# Patient Record
Sex: FEMALE | Race: WHITE | NOT HISPANIC OR LATINO | URBAN - METROPOLITAN AREA
[De-identification: names, ages, dates, MRNs, and addresses within clinical notes are randomized per-mention and may not be internally consistent; named-entity substitution may affect disease eponyms.]

---
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
## 2023-11-21 ENCOUNTER — HOSPITAL ENCOUNTER (EMERGENCY)
Facility: CLINIC | Age: 18
Discharge: HOME | End: 2023-11-21
Admitting: FAMILY MEDICINE
Payer: COMMERCIAL

## 2023-11-21 PROCEDURE — 99281 EMR DPT VST MAYX REQ PHY/QHP: CPT

## 2024-12-12 ENCOUNTER — TRANSFERRED RECORDS (OUTPATIENT)
Dept: HEALTH INFORMATION MANAGEMENT | Facility: CLINIC | Age: 19
End: 2024-12-12
Payer: COMMERCIAL

## 2024-12-16 ENCOUNTER — MEDICAL CORRESPONDENCE (OUTPATIENT)
Dept: HEALTH INFORMATION MANAGEMENT | Facility: CLINIC | Age: 19
End: 2024-12-16
Payer: COMMERCIAL

## 2025-01-08 ENCOUNTER — TELEPHONE (OUTPATIENT)
Dept: OTOLARYNGOLOGY | Facility: CLINIC | Age: 20
End: 2025-01-08
Payer: COMMERCIAL

## 2025-01-08 NOTE — TELEPHONE ENCOUNTER
Lvm to get patient scheduled for urgent visit with [facility] voice for Tomorrow (Wednesday) at 3:30 with Loree Hess   Friday 10/10 at 2 or 3 with Isaias Reeves.   Gave direct call back number.  Piedad Segura on 1/8/2025 at 11:53 AM

## 2025-01-09 ENCOUNTER — PRE VISIT (OUTPATIENT)
Dept: OTOLARYNGOLOGY | Facility: CLINIC | Age: 20
End: 2025-01-09

## 2025-01-09 ENCOUNTER — OFFICE VISIT (OUTPATIENT)
Dept: OTOLARYNGOLOGY | Facility: CLINIC | Age: 20
End: 2025-01-09
Payer: COMMERCIAL

## 2025-01-09 DIAGNOSIS — J38.4 VOCAL FOLD EDEMA: ICD-10-CM

## 2025-01-09 DIAGNOSIS — R49.0 DYSPHONIA: Primary | ICD-10-CM

## 2025-01-09 NOTE — TELEPHONE ENCOUNTER
FUTURE VISIT INFORMATION      FUTURE VISIT INFORMATION:  Date: 1/9/25  Time: 3:30pm  Location: [facility]  REFERRAL INFORMATION:  Reason for visit/diagnosis  vernon    RECORDS REQUESTED FROM:

## 2025-01-09 NOTE — PROGRESS NOTES
[facility]  Clinical Voice and Upper Airway Evaluation Report    Patient's Name: ***  Date of Evaluation: 1/9/2025  Providing SLP: Loree Hess MS CCC-SLP  Seen in Conjunction With: ***, MD - ***, [facility]-SLP  Referring Provider and Facility: ***  Insurance Coverage: *** (Certification Dates: *** - ***)  Chief Complaint: ***  Evaluation Location: [facility]  Others in Attendance for the Evaluation: ***  Time of Evaluation: 3:41 - ***      Patient History: *** is a ***-year-old *** who presents today for evaluation of ***.     Dysphonia:   Onset: ***  Progression: ***  Concerns  Many illnesses this past semester + very stuffy new apartment  Vocal fatigue  Would recover to about 80% between each cold before getting another  Most recent illness was in the early/middle of December and is gradually improving since then  Singing voice changes  Upper register has resonance change  Whistle tones are less present  Patterns:   Has been resting her voice over break per   Denies improvements with Flonase  Does take an antihistamine  Improves with: ***  Worsens with: ***  Previous voice therapy or other interventions: ***  Voice use considerations:   [employer] vocal music education major    Dyspnea:   Onset: ***  Progression: ***  Respiratory conditions: denies  Inhaled treatments: ***  Most recent PFTs: ***  Concerns  More difficulty with ***  Noisy on ***  *** sensation in the ***  ***  Breathing somewhat different since all these illnesses  Triggers: ***  Length of onset: ***  Length of recovery: ***    Dysphagia:   Onset: ***  Progression: ***  Concerns  ***  Difficult items: ***  Weight loss: ***  Recent pneumonias/chest infections: ***  Length of meal time: ***  Previous swallow study results: ***  GERD symptoms: historically but little problem currently    Cough / Throat Clearing: attempting to avoid throat clearing - mostly secondary nasal congestion  Onset:  "***  Progression: ***  Concerns  More *** than ***  Frequency: ***  Triggers: ***  Improves with: ***    Throat Pain:  Onset: ***  Progression: ***  Concerns  ***  Patterns: ***  Improves with: ***  Worsens with: ***    Medical / Surgical History:   ***    Other Medical Evaluations, Testing, and Treatments:   ***    Cognitive Status / Ability to Comprehend Directions: ***    Barriers to Progress: ***    Daily Water Intake: ***    Caffeine Intake: ***    Other Beverage Intake: ***    Alcohol Intake: ***    Past / Current Tobacco Use / Exposure: ***    Current Diet: ***    Weight: ***    Work / School Status: ***        Quality of Life Questionnaires:    PATIENT REPORTED MEASURES:       No data to display                     No data to display                     No data to display                     No data to display                     No data to display                SINGING VOICE HANDICAP INDEX -10  (SVHI10)  It takes a lot of effort sing    I am unsure of what will come out when I sing    My voice \"gives out\" on me while I am singing    My singing voice upsets me    I have no confidence in my singing voice    I have trouble making my voice do what I want it to do    I have to \"push\" to produce my voice when singing    My singing voice tires easily    I feel something is missing in my life because of my inability to sing    I am unable to use my \"high voice\"    Total         Perceptual Analysis (71476): Evaluation of Voice / Speech / Non-Communicative Laryngeal Behaviors    The GRBAS is a perceptual rating of voice change. 0 indicates no impairment, 3 indicates a severe impairment. \"C\" and \"I\" may be used to signify if these feature was observed consistently or intermittently respectively. This is a rating based on clinical judgement of disordered voice quality.  G ( *** ) General Dysphonia     R ( *** ) Roughness     B ( *** ) Breathiness     A ( *** ) Asthenia     S ( *** ) Strain  Additional information: " "***    Stimuli for differential diagnosis of spasmodic dysphonia and vocal tremor:  Sustained vowel: ***  Voiced-loaded stimuli (e.g. \"We were away a year ago,\" counting from 80-90): ***  Voiceless-loaded stimuli (e.g. \"How hard did he hit him?,\" counting from 50-60): ***  Singing: ***    *Validity of this measure may be slightly reduced when performed via acoustic signal from virtual visit*    Laryngeal palpation:   Thyrohyoid space: ***  Suprahyoid region: ***  Laryngeal lateralization: ***    Additional observations:   Cough/ Throat Clear: {coughthroatclear:662137}    Breathing patterns: appropriate at rest ***  Overt tension: \" \" ***  Habitual pitch: WNL compared to age- and gender-matched peers ***  Pitch range: \" \" ***  Resonance: ***  Loudness: appropriate ***    The Milstein Breathing Pattern Assessment Index (M-BPAI) is an evaluation of respiratory mechanics in patients with breathing pattern disorder and exercise-induced laryngeal obstruction. Patients are cued to: 1) take one fast and deep breath in through the mouth, hold it for one second, then breathe out x3 and 2) take four deep and fast breaths in rapid succession. A score at or above 8 indicates presence of breathing pattern disorder.  Chest Expansion (no = 0, yes = 1) ( *** )  Abdominal Movement (out = 0, none = 1, in = 2) ( *** )  Shoulder Displacement (none = 0, mild 0-0.5\" = 1, moderate 0.5-1.5\" = 2, significant > 1.5-3\") ( *** )  Strap Muscles (none = 1, mild = 1, moderate = 2, significant = 3) ( *** )  Head Tilt (no = 0, yes = 3) ( *** )  Stridor (no = 0, yes = 3) ( *** )  Total Score: ***      Acoustic and Aerodynamic Analysis (91237):    Voice samples were recorded and analyzed within [employer] software with aerodynamic information taken in [employer] PAS software. *** 52 modifier will be used for billing purposes, as aerodynamic evaluation could not be completed    Normative data:  Jitter %: less than 1  Shimmer dB: less than 0.35  NHR: less " "than 0.194  Average f0 in connected speech: 170-220 female, 100-150 male  CPPS: greater than 19.10 for Praat    Interpretation:  ***  Acoustic findings of *** elevated perturbation features*** are consistent with perceptual finding of ***  Aerodynamic finding of *** low air flow and *** elevated peak air flow pressure are consistent with patient reported increased effort.      Laryngoscopy with*** stroboscopy:    Provider performing exam: ***, MD / Loree Hess MS CCC-SLP    Informed consent: Informed verbal consent was obtained, which includes potential side-effects, risks, and benefits of the procedure.     Anesthetic: Topical anesthesia with 3% lidocaine and 0.25% phenylephrine was applied the nostrils bilaterally. Viscous lidocaine 4% was applied to the tip of the endoscope.    Scope type: A distal chip flexible laryngoscope was passed through the nare with halogen *** light source(s).    Scope serial number: ***    The laryngeal and pharyngeal structures were evaluated for gross appearance, mobility, function, and focal lesions / abnormalities of the associated mucosa.  Velar Function: complete closure without bubbling of secretions and no weakness observed ***  General Appearance: WNL ***  Secretions: WNL ***  Subglottis Appearance: visible portion is patent ***  R Arytenoid Abduction / Adduction: symmetrical and timely ab/adduction with appropriate range of motion ***  L Arytenoid Abduction / Adduction: \" \" ***  Mediolateral Compression: WNL ***  Anteroposterior Compression: WNL ***  Vocal Fold Elongation: appropriate ***  Left (L) Vocal Fold Edge and Mucosa: white with smooth and straight appearance ***  Right (R) Vocal Fold Edge and Mucosa: \" \" ***  Narrow Band Imaging: demonstrates similar findings as halogen light ***  Glottic Closure: complete ***  Phase Closure: predominantly open phase ***  Vertical Level of Approximation: true vocal fold appear to adduct at the same height ***  L Amplitude: WNL " "***  R Amplitude: WNL***  L Mucosal Wave: WNL ***  R Mucosal Wave: WNL ***  Phase Symmetry: symmetrical ***  Periodicity: periodic ***  Inhalation Phonation: similar findings to exhalation phonation ***  Other Observations: ***  Patient was asked to mimic how it felt and sounded when they were experiencing dyspnea, and this demonstrated *** inappropriate vocal fold adduction of ***% of the glottis with *** hooding of the arytenoid complexes and adduction of the true vocal folds, yielding stridor  Therapeutic Probes: ***  Humming resulted in ***  Flow/high airflow stimuli resulted in ***  Glottal coup resulted in ***  Increased volume resulted in ***  Rescue breathing techniques utilizing brisk, nasal inhalation and pursed lip inspiration with prolonged, high pressure exhalation revealed *** maximal vocal fold abduction with maintenance of the glottic airway during exhalation  A coughing attack was triggered during laryngoscopy today, and pulsed \"sh\" resulted in reduced length and severity of the attack    FEES: Evaluation was performed by *** and Dr. Crisostomo. Impressions from this clinical document indicates: \"***\"       Therapeutic Techniques Attempted (85556 for Individual Speech Therapy):    ***       Impressions and Plan:     *** is a ***-year-old *** presenting today with *** secondary to ***. Perceptually, ***. Laryngoscopy today demonstrated ***. Therefore, *** has been recommended. ***. *** is in agreement with this plan of care.     RESEARCH: A warm introduction was *** provided regarding participation in laryngology research studies. This patient is *** interested in being contacted.    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 *** is in agreement with this plan of care and plans to check with her insurance about coverage prior to beginning sessions. - Goals: to improve and maintain a healthy voice quality, to understand the problem and fix it as much as possible, report resolution of symptoms, and use of optimal voice quality and comfort to meet personal, social, and professional needs, 90% of the time during a typical week of vocal activities      Goals:    VCD/PVFM  Gradually decrease VCD episodes to increase quality of life and ability to participate in high level cardio activity without limitations  Perform rescue breathing techniques while asymptomatic to improve automatic response when experiencing dyspnea  Perform rescue breathing techniques before and during exercise to prevent dyspnea/severe VCD attack  Perform rescue breathing during exercise or when exposed to a trigger item to resolve a VCD attack or coughing episode  Continue participating in high level cardio activity while performing rescue breathing to resolve dyspnea  Utilize abdominal breathing techniques in targeted activities (e.g. physical exercise, communication, high-level phonation/pitch range navigation exercises, etc.)  Rebalance laryngeal musculature and strengthen inspiratory muscles resulting in decreased laryngeal sensitivity and decreased frequency of VCD episodes.?  Demonstrate appropriate vocal hygiene including, but not limited to, adequate hydration and  integrating behavioral and dietary changes for GERD into their daily life.?   Recoordinate respiratory and laryngeal musculature to resume activities of daily living.?   Patient will perform advanced breathing exercises with the respiratory  focusing on inspiratory muscle strengthening with minimal assistance 90% of the time.  Patient will demonstrate abdominal focused breathing technique in targeted exercises with minimal assistance 90% of the time.?   Patient will demonstrate abdominal focused breathing technique with physical activity with minimal assistance 90% of the time.  Gradually reduce inappropriate and excessive inhaler use  Demonstrate a 50% reduction in Dyspnea Index score  Patient will express satisfaction with their breathing and their ability to participate in social, personal, and work/school functions without limitation    Chronic Cough  Decrease cough in all activities of daily living using compensation strategies  Independently implement a cough suppression strategy when cough trigger is sensed 90% of the time.?   Decrease the number of coughs per day by 50%   Demonstrate increased tolerance of a chemical and/or environmental irritant as evidenced by increased exposure (decreased proximity and/or increased strength) to that irritant by 50%   Demonstrate a 50% reduction in Cough Severity Index score  Patient will express satisfaction with their coughing and their ability to participate in social, personal, and work/school functions without limitation     Voice  Coordinate phonatory and respiratory subsystems, demonstrating adequate independence for activities of daily communication   Decrease extrinsic laryngeal muscle activity during communication events   Improve voice quality in all activities of daily living using, as measured by a minimum of a 50% point reduction on the Voice Handicap Index-10 or Singing VHI  Demonstrate appropriate vocal hygiene including, but not limited to, adequate  hydration, avoiding vocal abuse, integrating behavioral and dietary changes for GERD into their daily life?.   Incorporate behaviors to reduce irritation and promote laryngeal healing and prevent recurrence.?   Implement behavioral strategies to reduce laryngopharyngeal reflux. ?   Independently maintain a forward focus voice placement 90% of the time during conversational speech.  Independently perform the Vocal Function Exercises, rebalancing respiration, phonation, and resonance 90% of the time  Perform High Level Phonation and Pitch Range Navigation Exercises independently 90% of the time  Patient will express satisfaction with their voice quality and function and their ability to participate in social, personal, and work/school functions without limitation    Perioperative  Adhere to complete vocal rest recommendations in the acute post-operative period  Gradually increase voice use following the complete voice rest period  Adhere to vocal hygiene recommendations including smoking cessation, hydration, avoidance of caffeine and alcohol, and behavioral reflux precautions  Perform rescue breathing techniques several times daily  Incorporate SOVT exercises gradually in the immediate post-operative period   Participate in pre- and post-operative voice therapy      Billed Procedures:    Laryngoscopy without stroboscopy 64639  Laryngoscopy with stroboscopy 88643  Individual speech therapy session 73185  Perceptual voice assessment 50033  Laryngeal function studies 20620 with 52 modifier  Assessment and treatment time: *** minutes  Chart review, interpretation of testing, documentation preparation, etc: *** minutes      Thank you for allowing me to participate in this patient's care,    Loree Hess MS CCC-SLP  Speech-Language Pathologist  [facility]  Department of Otolaryngology - Head and Neck Surgery  [facility]  [email or web address]  Direct: [phone]  Scheduling:  [phone]    *This note may have been completed using [id number] dictation software, so errors may exist. Please contact me for clarification if needed*

## 2025-01-09 NOTE — LETTER
"1/9/2025       RE: Yaneth Sun  [address]  [place] [address]     Dear Colleague,    Thank you for referring your patient, Yaneth Sun, to the [facility] [place] at [facility]. Please see a copy of my visit note below.    [facility]  Clinical Voice and Upper Airway Evaluation Report    Patient's Name: Yaneth Sun  Date of Evaluation: 1/9/2025  Providing SLP: Loree Hess MS CCC-SLP  Referring Provider and Facility: self-referred  Insurance Coverage: [facility]   Chief Complaint: Dysphonia  Evaluation Location: [facility]  Time of Evaluation: 3:41 - 4:40 PM      Patient History: Yaneth is a 19-year-old female who presents today for evaluation of dysphonia.     Dysphonia:   Onset  Many illnesses this past semester + very \"stuffy\" new apartment  Vocal fatigue  Would recover to about 80% between each cold before getting another  Most recent illness was in the early/middle of December, and voice has been gradually improving since then  Concerns  Singing voice changes  Upper register has resonance change  Whistle tones are less present  Patterns:   Has been resting her voice over Winter Break  Denies improvements with Flonase  Does take an antihistamine  Voice use considerations: tavia vocal music education major at the [facility]    Dyspnea:   Respiratory conditions: denies  Concerns: breathing somewhat different since all these illnesses    Dysphagia: denies  GERD symptoms: historically but little problem currently    Cough / Throat Clearing: attempting to avoid throat clearing - mostly secondary nasal congestion    Throat Pain: denies      Quality of Life Questionnaires: not completed by the patient      Perceptual Analysis (65081): Evaluation of Voice / Speech / Non-Communicative Laryngeal Behaviors    The GRBAS is a perceptual rating of " "voice change. 0 indicates no impairment, 3 indicates a severe impairment. \"C\" and \"I\" may be used to signify if these feature was observed consistently or intermittently respectively. This is a rating based on clinical judgement of disordered voice quality.  G ( 0-1 ) General Dysphonia     R ( 0-1 - I ) Roughness     B ( 0 ) Breathiness     A ( 0 ) Asthenia     S ( 0-1 ) Strain  Additional information: very mild and intermittent glottal sam in connected speech    *Validity of this measure may be slightly reduced when performed via acoustic signal from virtual visit*    Additional observations:   Cough/ Throat Clear: not observed today    Breathing patterns: appropriate at rest   Overt tension: \" \"   Habitual pitch: WNL compared to age- and gender-matched peers   Pitch range: \" \"   Resonance: forward-focused  Loudness: appropriate       Laryngoscopy with stroboscopy:    Provider performing exam: Loree Hess MS CCC-SLP    Informed consent: Informed verbal consent was obtained, which includes potential side-effects, risks, and benefits of the procedure.     Anesthetic: Topical anesthesia with 3% lidocaine and 0.25% phenylephrine was applied the nostrils bilaterally.     Scope type: A distal chip flexible laryngoscope was passed through the nare with xenon light source(s).    The laryngeal and pharyngeal structures were evaluated for gross appearance, mobility, function, and focal lesions / abnormalities of the associated mucosa.  Velar Function: not assessed today  General Appearance:   Mild cobblestoning of the posterior pharyngeal wall  Mild post-cricoid edema  Mild interarytenoid pachydermia  Secretions: increased in the velopharyngeal port and collecting on the vocal folds  Subglottis Appearance: visible portion is patent   R Arytenoid Abduction / Adduction: symmetrical and timely ab/adduction with appropriate range of motion   L Arytenoid Abduction / Adduction: \" \"   Mediolateral Compression: WNL " "  Anteroposterior Compression: minimal with phonation  Vocal Fold Elongation: appropriate   Left (L) Vocal Fold Edge and Mucosa: white with very slight but flat fullness at the anterior 1/3 vibratory edge - this is only visible in the upper voicing extremes (e.g. F#5 and above)  Right (R) Vocal Fold Edge and Mucosa: \" \" - R fullness is flat but does extend over the middle 1/3 of the vibratory edge  Narrow Band Imaging: demonstrates similar findings as halogen light   Glottic Closure: complete   Phase Closure: predominantly open phase   Vertical Level of Approximation: true vocal fold appear to adduct at the same height   L Amplitude: WNL   R Amplitude: WNL  L Mucosal Wave: minimally reduced  R Mucosal Wave: WNL   Phase Symmetry: mild and intermittent chasing wave asymmetry, which does not impact glottic closure  Periodicity: periodic   Inhalation Phonation: similar findings to exhalation phonation   Other Observations: vocal fold pliability in upper registers is appropriate    High pitch phonation (e.g. F# 5 and above)      Narrow band imaging:      Abduction:        Impressions and Plan:     Yaneth is a 19-year-old female presenting today with dysphonia R49.0 secondary to mild vocal fold swelling J38.4. Perceptually, her voice is minimally and intermittently rough and strained, most consistent with socially-appropriate glottal sam. Laryngoscopy today demonstrated very mild and sessile fullness at the anterior/middle 1/3 of the R>L vocal fold. These lesions appear pliable and are only observed when phonating in upper pitch extremes, nor is any compensatory laryngeal hyperfunction observed. Therefore, no additional voice care is needed, and continued vocal hygiene/budgeting and illness prevention has been recommended. Yaneth is in agreement with this plan of care.     RESEARCH: A warm introduction was not provided regarding participation in laryngology research studies.       Billed Procedures:    Laryngoscopy with " stroboscopy 47931  Perceptual voice assessment 70075  Assessment and treatment time: 59 minutes  Chart review, interpretation of testing, documentation preparation, etc: 22 minutes      Thank you for allowing me to participate in this patient's care,    Loree Hess MS CCC-SLP  Speech-Language Pathologist  [facility]  Department of Otolaryngology - Head and Neck Surgery  [facility]  [email or web address]  Direct: [phone]  [phone]    *This note may have been completed using [id number] dictation software, so errors may exist. Please contact me for clarification if needed*      Again, thank you for allowing me to participate in the care of your patient.      Sincerely,    Loree Hess, SLP

## 2025-01-20 ENCOUNTER — HOSPITAL ENCOUNTER (EMERGENCY)
Facility: CLINIC | Age: 20
Discharge: HOME | End: 2025-01-20
Attending: EMERGENCY MEDICINE | Admitting: EMERGENCY MEDICINE
Payer: COMMERCIAL

## 2025-01-20 VITALS
DIASTOLIC BLOOD PRESSURE: 70 MMHG | WEIGHT: 140 LBS | BODY MASS INDEX: 24.8 KG/M2 | RESPIRATION RATE: 18 BRPM | HEART RATE: 99 BPM | HEIGHT: 63 IN | OXYGEN SATURATION: 100 % | TEMPERATURE: 97.4 F | SYSTOLIC BLOOD PRESSURE: 135 MMHG

## 2025-01-20 DIAGNOSIS — R19.7 VOMITING AND DIARRHEA: ICD-10-CM

## 2025-01-20 DIAGNOSIS — R11.10 VOMITING AND DIARRHEA: ICD-10-CM

## 2025-01-20 LAB
ALBUMIN SERPL BCG-MCNC: 4.3 G/DL (ref 3.5–5.2)
ALP SERPL-CCNC: 70 U/L (ref 40–150)
ALT SERPL W P-5'-P-CCNC: 26 U/L (ref 0–50)
ANION GAP SERPL CALCULATED.3IONS-SCNC: 15 MMOL/L (ref 7–15)
AST SERPL W P-5'-P-CCNC: 41 U/L (ref 0–45)
BASOPHILS # BLD AUTO: 0 10E3/UL (ref 0–0.2)
BASOPHILS NFR BLD AUTO: 0 %
BILIRUB SERPL-MCNC: 1.3 MG/DL
BUN SERPL-MCNC: 12.5 MG/DL (ref 6–20)
CALCIUM SERPL-MCNC: 9.2 MG/DL (ref 8.8–10.4)
CHLORIDE SERPL-SCNC: 105 MMOL/L (ref 98–107)
CREAT SERPL-MCNC: 0.68 MG/DL (ref 0.51–0.95)
EGFRCR SERPLBLD CKD-EPI 2021: >90 ML/MIN/1.73M2
EOSINOPHIL # BLD AUTO: 0 10E3/UL (ref 0–0.7)
EOSINOPHIL NFR BLD AUTO: 0 %
ERYTHROCYTE [DISTWIDTH] IN BLOOD BY AUTOMATED COUNT: 12.1 % (ref 10–15)
GLUCOSE SERPL-MCNC: 154 MG/DL (ref 70–99)
HCG SERPL QL: NEGATIVE
HCO3 SERPL-SCNC: 19 MMOL/L (ref 22–29)
HCT VFR BLD AUTO: 38.7 % (ref 35–47)
HGB BLD-MCNC: 14 G/DL (ref 11.7–15.7)
IMM GRANULOCYTES # BLD: 0 10E3/UL
IMM GRANULOCYTES NFR BLD: 0 %
LIPASE SERPL-CCNC: 26 U/L (ref 13–60)
LYMPHOCYTES # BLD AUTO: 0.5 10E3/UL (ref 0.8–5.3)
LYMPHOCYTES NFR BLD AUTO: 4 %
MCH RBC QN AUTO: 29.2 PG (ref 26.5–33)
MCHC RBC AUTO-ENTMCNC: 36.2 G/DL (ref 31.5–36.5)
MCV RBC AUTO: 81 FL (ref 78–100)
MONOCYTES # BLD AUTO: 0.6 10E3/UL (ref 0–1.3)
MONOCYTES NFR BLD AUTO: 5 %
NEUTROPHILS # BLD AUTO: 11.6 10E3/UL (ref 1.6–8.3)
NEUTROPHILS NFR BLD AUTO: 91 %
NRBC # BLD AUTO: 0 10E3/UL
NRBC BLD AUTO-RTO: 0 /100
PLATELET # BLD AUTO: 329 10E3/UL (ref 150–450)
POTASSIUM SERPL-SCNC: 4.2 MMOL/L (ref 3.4–5.3)
PROT SERPL-MCNC: 7.1 G/DL (ref 6.4–8.3)
RBC # BLD AUTO: 4.79 10E6/UL (ref 3.8–5.2)
SODIUM SERPL-SCNC: 139 MMOL/L (ref 135–145)
WBC # BLD AUTO: 12.7 10E3/UL (ref 4–11)

## 2025-01-20 PROCEDURE — 250N000011 HC RX IP 250 OP 636: Performed by: EMERGENCY MEDICINE

## 2025-01-20 PROCEDURE — 258N000003 HC RX IP 258 OP 636: Performed by: EMERGENCY MEDICINE

## 2025-01-20 PROCEDURE — 99284 EMERGENCY DEPT VISIT MOD MDM: CPT | Performed by: EMERGENCY MEDICINE

## 2025-01-20 PROCEDURE — 96375 TX/PRO/DX INJ NEW DRUG ADDON: CPT | Performed by: EMERGENCY MEDICINE

## 2025-01-20 PROCEDURE — 96374 THER/PROPH/DIAG INJ IV PUSH: CPT | Performed by: EMERGENCY MEDICINE

## 2025-01-20 PROCEDURE — 85004 AUTOMATED DIFF WBC COUNT: CPT | Performed by: EMERGENCY MEDICINE

## 2025-01-20 PROCEDURE — 84460 ALANINE AMINO (ALT) (SGPT): CPT | Performed by: EMERGENCY MEDICINE

## 2025-01-20 PROCEDURE — 84703 CHORIONIC GONADOTROPIN ASSAY: CPT | Performed by: EMERGENCY MEDICINE

## 2025-01-20 PROCEDURE — 83690 ASSAY OF LIPASE: CPT | Performed by: EMERGENCY MEDICINE

## 2025-01-20 PROCEDURE — 96361 HYDRATE IV INFUSION ADD-ON: CPT | Performed by: EMERGENCY MEDICINE

## 2025-01-20 PROCEDURE — 36415 COLL VENOUS BLD VENIPUNCTURE: CPT | Performed by: EMERGENCY MEDICINE

## 2025-01-20 PROCEDURE — 99284 EMERGENCY DEPT VISIT MOD MDM: CPT | Mod: 25 | Performed by: EMERGENCY MEDICINE

## 2025-01-20 PROCEDURE — 84155 ASSAY OF PROTEIN SERUM: CPT | Performed by: EMERGENCY MEDICINE

## 2025-01-20 RX ORDER — SODIUM CHLORIDE 9 MG/ML
INJECTION, SOLUTION INTRAVENOUS CONTINUOUS
Status: DISCONTINUED | OUTPATIENT
Start: 2025-01-20 | End: 2025-01-20 | Stop reason: HOSPADM

## 2025-01-20 RX ORDER — HYDROMORPHONE HYDROCHLORIDE 1 MG/ML
0.5 INJECTION, SOLUTION INTRAMUSCULAR; INTRAVENOUS; SUBCUTANEOUS ONCE
Status: COMPLETED | OUTPATIENT
Start: 2025-01-20 | End: 2025-01-20

## 2025-01-20 RX ORDER — ONDANSETRON 2 MG/ML
8 INJECTION INTRAMUSCULAR; INTRAVENOUS ONCE
Status: COMPLETED | OUTPATIENT
Start: 2025-01-20 | End: 2025-01-20

## 2025-01-20 RX ORDER — ONDANSETRON 4 MG/1
4 TABLET, ORALLY DISINTEGRATING ORAL EVERY 6 HOURS PRN
Qty: 10 TABLET | Refills: 0 | Status: SHIPPED | OUTPATIENT
Start: 2025-01-20 | End: 2025-01-23

## 2025-01-20 RX ADMIN — SODIUM CHLORIDE: 9 INJECTION, SOLUTION INTRAVENOUS at 12:50

## 2025-01-20 RX ADMIN — SODIUM CHLORIDE 1000 ML: 9 INJECTION, SOLUTION INTRAVENOUS at 12:53

## 2025-01-20 RX ADMIN — ONDANSETRON 8 MG: 2 INJECTION INTRAMUSCULAR; INTRAVENOUS at 12:51

## 2025-01-20 RX ADMIN — HYDROMORPHONE HYDROCHLORIDE 0.5 MG: 1 INJECTION, SOLUTION INTRAMUSCULAR; INTRAVENOUS; SUBCUTANEOUS at 12:52

## 2025-01-20 ASSESSMENT — ACTIVITIES OF DAILY LIVING (ADL)
ADLS_ACUITY_SCORE: 41

## 2025-01-20 ASSESSMENT — COLUMBIA-SUICIDE SEVERITY RATING SCALE - C-SSRS
6. HAVE YOU EVER DONE ANYTHING, STARTED TO DO ANYTHING, OR PREPARED TO DO ANYTHING TO END YOUR LIFE?: NO
1. IN THE PAST MONTH, HAVE YOU WISHED YOU WERE DEAD OR WISHED YOU COULD GO TO SLEEP AND NOT WAKE UP?: NO
2. HAVE YOU ACTUALLY HAD ANY THOUGHTS OF KILLING YOURSELF IN THE PAST MONTH?: NO

## 2025-01-20 NOTE — ED TRIAGE NOTES
Started abruptly at 2am this morning. Vomiting up bile right now, yellow-jatinder in color. Denies any diarrhea. Sleepy, didn't sleep well. Having pain in mouth, from all of the acid from vomiting.      Triage Assessment (Adult)       Row Name 01/20/25 1132          Triage Assessment    Airway WDL WDL        Respiratory WDL    Respiratory WDL WDL        Skin Circulation/Temperature WDL    Skin Circulation/Temperature WDL WDL        Cardiac WDL    Cardiac WDL WDL        Peripheral/Neurovascular WDL    Peripheral Neurovascular WDL WDL        Cognitive/Neuro/Behavioral WDL    Cognitive/Neuro/Behavioral WDL WDL

## 2025-01-20 NOTE — Clinical Note
Yaneth Sun was seen and treated in our emergency department on 1/20/2025.  She may return to work on 01/23/2025.       If you have any questions or concerns, please don't hesitate to call.      Christopher Agrawal MD

## 2025-01-20 NOTE — ED PROVIDER NOTES
ED PROVIDER NOTE  [facility] [place]      History     Chief Complaint   Patient presents with    Nausea & Vomiting     Started abruptly at 2am this morning. Vomiting up bile right now, yellow-jatinder in color. Denies any diarrhea.      HPI  Yaneth Sun is a 19 year old female who presents emergency department with complaints of nausea and vomiting that started at 3 AM.  Patient denies any fevers or any diarrhea. States she was exposed to norovirus recently through a friend of the family and presents here for evaluation.  The patient denies any previous history of pancreatitis. Denies any previous history of colitis or Crohn's disease.     This part of the medical record was transcribed by Rosalba Snyder, Medical Scribe, from a dictation done by Christopher Agrawal MD.      I have reviewed the Medications, Allergies, Past Medical and Surgical History, and Social History in the Epic system.    History reviewed. No pertinent past medical history.    History reviewed. No pertinent surgical history.        Dose / Directions   hydrOXYzine HCl 25 MG tablet  Commonly known as: ATARAX      as needed  Refills: 0     Loryna 3-0.02 MG tablet  Generic drug: drospirenone-ethinyl estradiol      Dose: 1 tablet  Take 1 tablet by mouth daily  Refills: 0     venlafaxine 75 MG 24 hr capsule  Commonly known as: EFFEXOR XR      TAKE ONE Capsule BY MOUTH EVERY DAY WITH FOOD  Refills: 0         Past medical history, past surgical history, medications, and allergies were reviewed with the patient. Additional pertinent items: None    History reviewed. No pertinent family history.    Social History     Tobacco Use    Smoking status: Never    Smokeless tobacco: Never   Substance Use Topics    Alcohol use: Not on file     Social history was reviewed with the patient. Additional pertinent items: None    No Known Allergies    Review of Systems  A medically appropriate review of systems was performed  "with pertinent positives and negatives noted in the HPI, and all other systems negative.    Physical Exam   BP: 116/77  Pulse: 112  Temp: 97.4  F (36.3  C)  Resp: 16  Height: 158.8 cm (5' 2.5\")  Weight: 63.5 kg (140 lb)  SpO2: 95 %      Physical Exam  Vitals and nursing note reviewed.   HENT:      Head: Atraumatic.   Eyes:      Extraocular Movements: Extraocular movements intact.      Pupils: Pupils are equal, round, and reactive to light.   Cardiovascular:      Rate and Rhythm: Regular rhythm.   Pulmonary:      Breath sounds: No wheezing or rales.   Abdominal:      Palpations: Abdomen is soft.      Comments: Mild upper abdominal tenderness   Musculoskeletal:         General: No deformity.      Cervical back: Neck supple.   Neurological:      General: No focal deficit present.      Mental Status: She is alert and oriented to person, place, and time.   Psychiatric:         Mood and Affect: Mood normal.         ED Course     Orders Placed This Encounter   Procedures    Comprehensive metabolic panel    Lipase    HCG qualitative Blood    CBC with platelets and differential    Peripheral IV: Standard    Enteric Bacteria and Virus Panel PCR    CBC with platelets differential          Procedures         Medications   sodium chloride 0.9% BOLUS 1,000 mL (1,000 mLs Intravenous $New Bag 1/20/25 1253)   sodium chloride 0.9 % infusion (0 mLs Intravenous Paused 1/20/25 1303)   ondansetron (ZOFRAN) injection 8 mg (8 mg Intravenous $Given 1/20/25 1251)   HYDROmorphone (PF) (DILAUDID) injection 0.5 mg (0.5 mg Intravenous $Given 1/20/25 1252)       Results for orders placed or performed during the hospital encounter of 01/20/25 (from the past 24 hours)   CBC with platelets differential    Narrative    The following orders were created for panel order CBC with platelets differential.  Procedure                               Abnormality         Status                     ---------                               -----------         " ------                     CBC with platelets and d...[[id number]]  Abnormal            Final result                 Please view results for these tests on the individual orders.   Comprehensive metabolic panel   Result Value Ref Range    Sodium 139 135 - 145 mmol/L    Potassium 4.2 3.4 - 5.3 mmol/L    Carbon Dioxide (CO2) 19 (L) 22 - 29 mmol/L    Anion Gap 15 7 - 15 mmol/L    Urea Nitrogen 12.5 6.0 - 20.0 mg/dL    Creatinine 0.68 0.51 - 0.95 mg/dL    GFR Estimate >90 >60 mL/min/1.73m2    Calcium 9.2 8.8 - 10.4 mg/dL    Chloride 105 98 - 107 mmol/L    Glucose 154 (H) 70 - 99 mg/dL    Alkaline Phosphatase 70 40 - 150 U/L    AST 41 0 - 45 U/L    ALT 26 0 - 50 U/L    Protein Total 7.1 6.4 - 8.3 g/dL    Albumin 4.3 3.5 - 5.2 g/dL    Bilirubin Total 1.3 (H) <=1.2 mg/dL   Lipase   Result Value Ref Range    Lipase 26 13 - 60 U/L   HCG qualitative Blood   Result Value Ref Range    hCG Serum Qualitative Negative Negative   CBC with platelets and differential   Result Value Ref Range    WBC Count 12.7 (H) 4.0 - 11.0 10e3/uL    RBC Count 4.79 3.80 - 5.20 10e6/uL    Hemoglobin 14.0 11.7 - 15.7 g/dL    Hematocrit 38.7 35.0 - 47.0 %    MCV 81 78 - 100 fL    MCH 29.2 26.5 - 33.0 pg    MCHC 36.2 31.5 - 36.5 g/dL    RDW 12.1 10.0 - 15.0 %    Platelet Count 329 150 - 450 10e3/uL    % Neutrophils 91 %    % Lymphocytes 4 %    % Monocytes 5 %    % Eosinophils 0 %    % Basophils 0 %    % Immature Granulocytes 0 %    NRBCs per 100 WBC 0 <1 /100    Absolute Neutrophils 11.6 (H) 1.6 - 8.3 10e3/uL    Absolute Lymphocytes 0.5 (L) 0.8 - 5.3 10e3/uL    Absolute Monocytes 0.6 0.0 - 1.3 10e3/uL    Absolute Eosinophils 0.0 0.0 - 0.7 10e3/uL    Absolute Basophils 0.0 0.0 - 0.2 10e3/uL    Absolute Immature Granulocytes 0.0 <=0.4 10e3/uL    Absolute NRBCs 0.0 10e3/uL       Critical care was not performed.     Medical Decision Making  The patient's presentation was of moderate complexity (an acute illness with systemic symptoms).    The patient's  evaluation involved:  ordering and/or review of 3+ test(s) in this encounter (see above)    The patient's management necessitated high risk (a parenteral controlled substance).      Assessments & Plan (with Medical Decision Making)     I have reviewed the nursing notes.    After medications and fluids given above the patient felt much better and did have 1 episode of diarrhea here in the ER.  More than likely her illness is norovirus but she will be sent home at this time with the medications below    I have reviewed the findings, diagnosis, plan and need for follow up with the patient.      New Prescriptions    ONDANSETRON (ZOFRAN ODT) 4 MG ODT TAB    Take 1 tablet (4 mg) by mouth every 6 hours as needed for nausea or vomiting.       Final diagnoses:   Vomiting and diarrhea     Home to rest today; work note given    Return a stool specimen to the lab for testing when available    Please make an appointment to follow up with Your Primary Care Provider in 5-7 days if not improving.    Routine discharge instructions were given for this diagnosis    GERONIMO BARRIOS MD    1/20/2025   [facility] EMERGENCY DEPARTMENT       Geronimo Barrios MD  01/20/25 [id number]

## 2025-02-26 NOTE — TELEPHONE ENCOUNTER
REFERRAL INFORMATION:  Referring By: Loree Hess, SLP   Referring Clinic: [facility]   Reason for Visit/Diagnosis: per pt, refd Dr FLEMING, Chronic laryngitis, [facility] confd      FUTURE VISIT INFORMATION:  Appointment Date: 5/27/25  Appointment Time: 9 AM     NOTES STATUS DETAILS   OFFICE NOTE from referring provider [facility]   1/9/25 -Loree Hess, SLP

## 2025-04-27 ENCOUNTER — HEALTH MAINTENANCE LETTER (OUTPATIENT)
Age: 20
End: 2025-04-27

## 2025-05-27 ENCOUNTER — PRE VISIT (OUTPATIENT)
Dept: OTOLARYNGOLOGY | Facility: CLINIC | Age: 20
End: 2025-05-27